# Patient Record
(demographics unavailable — no encounter records)

---

## 2024-10-09 NOTE — ASSESSMENT
[FreeTextEntry1] :      The patient has   NOT COME    to the VISIT   This Assessment  is  in a note Titled:  Non - Appointment    which  only reflects a summary and review of records The Assessment below is tentative and preliminary.  It is based only on information gathered from chart review and will need modification once the History is taken from the patient and the patient is examined.   1.          Informed Consent: * The risks & Benefits of EGD  /  Colonoscopy were discussed w patient. * This included but was not limited to perforation, bleeding, sedation /med rxns, missed lesions possibly requiring surgery, blood transfusions, antibiotics & CPR/Intubation. * Pt. understands & agrees to the procedures. The following instructions in regards to the prep and medically essential ( cardiac, pulmonary, sz, psych, endocrine)  pre-op medication administration was reviewed and emphasized with the patient .  * Pt. advised to D/C  ASA/NSAIDs  7  Days   PTP. * [ +++ ]  Dulcolax / Miralax / Mag. Citrate ,  [     ] Prepopik/ Clenpiq ,  [     ] Osmo Prep,  [    ] GoLytely,  prep. reviewed w Pt. * Hold  [           ] AM of procedure. * Hold  [           ] PM  before procedure. * Take  [           ] PM  before procedure. * Take  [           ] AM of procedure.

## 2024-10-09 NOTE — HISTORY OF PRESENT ILLNESS
[FreeTextEntry1] :       The patient   DID NOT COME   for the visit.  This HPI is  in a note Titled:  Non - Appointment   and reflects a summary and review of records : including previous and most recent  Labs, body imaging, consults and progress notes, operative and pathology reports, EKG reports, ED records, found in [facility], [employer],  [employer] and any additional records brought in by  the patient at the time of the visit.  It is for History taking purposes  PCP:  28 yo M w h/o  11/7/24    Today:  Feeling well, no c/o , CP, SOB/ PORTILLO, Cough, Wheeze, Palpitations, edema  11/7/24   Today:   * Abd pain-->no * Nausea--> no * Vomit--> no * Early satiety--> no * Belching--> no * Hiccups--> no * Regurgitation--> no * Acid Taste / Water Brash--> no * Ht burn--> no * Dysphagia--> no * Throat Clearing--> no * Hoarseness--> no * Post-Nasal Drip--> no * Congestion--> no * Globus--> no * Cough--> no * Wheeze / PC-> -no * BMs: # 4 qd * Constipation--> no * Diarrhea--> no * Bloating--> no * Strain on Defecation--> no * Incompl Evac--> no * Flatulence--> no * Gurgling--> no * Melena--> no * BPBPR-> -no * Anorexia--> no * Wt. Loss--> no